# Patient Record
Sex: FEMALE | Race: WHITE | NOT HISPANIC OR LATINO | Employment: OTHER | URBAN - METROPOLITAN AREA
[De-identification: names, ages, dates, MRNs, and addresses within clinical notes are randomized per-mention and may not be internally consistent; named-entity substitution may affect disease eponyms.]

---

## 2022-06-04 ENCOUNTER — TELEPHONE ENCOUNTER (OUTPATIENT)
Dept: URBAN - METROPOLITAN AREA CLINIC 68 | Facility: CLINIC | Age: 87
End: 2022-06-04

## 2022-06-04 RX ORDER — SODIUM SULFATE, POTASSIUM SULFATE, MAGNESIUM SULFATE 17.5; 3.13; 1.6 G/ML; G/ML; G/ML
SOLUTION, CONCENTRATE ORAL AS DIRECTED
Qty: 1 | Refills: 0 | OUTPATIENT
Start: 2019-01-10 | End: 2019-01-11

## 2022-06-05 ENCOUNTER — TELEPHONE ENCOUNTER (OUTPATIENT)
Dept: URBAN - METROPOLITAN AREA CLINIC 68 | Facility: CLINIC | Age: 87
End: 2022-06-05

## 2022-06-05 RX ORDER — ESOMEPRAZOLE MAGNESIUM 20 MG
NEXIUM( 20MG ORAL 2 TABS DAILY ) ACTIVE -HX ENTRY CAPSULE,DELAYED RELEASE (ENTERIC COATED) ORAL DAILY
Status: ACTIVE | COMMUNITY
Start: 2019-01-10

## 2022-06-05 RX ORDER — NICOTINE POLACRILEX 2 MG
BIOTIN( 1MG ORAL  DAILY ) ACTIVE -HX ENTRY GUM BUCCAL DAILY
Status: ACTIVE | COMMUNITY
Start: 2019-01-10

## 2022-06-05 RX ORDER — LEVOTHYROXINE SODIUM 125 UG/1
SYNTHROID( 125MCG ORAL  DAILY ) ACTIVE -HX ENTRY TABLET ORAL DAILY
Status: ACTIVE | COMMUNITY
Start: 2019-01-10

## 2022-06-05 RX ORDER — VITAM B12 100 MCG
VITAMIN B12( 100MCG ORAL  DAILY ) ACTIVE -HX ENTRY TAB ORAL DAILY
Status: ACTIVE | COMMUNITY
Start: 2019-01-10

## 2022-06-05 RX ORDER — TRIAMTERENE/HYDROCHLOROTHIAZID 37.5-25 MG
DYAZIDE( 37.5-25MG ORAL  DAILY ) ACTIVE -HX ENTRY CAPSULE ORAL DAILY
Status: ACTIVE | COMMUNITY
Start: 2019-01-10

## 2022-06-25 ENCOUNTER — TELEPHONE ENCOUNTER (OUTPATIENT)
Age: 87
End: 2022-06-25

## 2022-06-25 RX ORDER — SODIUM SULFATE, POTASSIUM SULFATE, MAGNESIUM SULFATE 17.5; 3.13; 1.6 G/ML; G/ML; G/ML
SOLUTION, CONCENTRATE ORAL AS DIRECTED
Qty: 1 | Refills: 0 | OUTPATIENT
Start: 2019-01-10 | End: 2019-01-11

## 2022-06-26 ENCOUNTER — TELEPHONE ENCOUNTER (OUTPATIENT)
Age: 87
End: 2022-06-26

## 2022-06-26 RX ORDER — LEVOTHYROXINE SODIUM 125 MCG
SYNTHROID( 125MCG ORAL  DAILY ) ACTIVE -HX ENTRY TABLET ORAL DAILY
Status: ACTIVE | COMMUNITY
Start: 2019-01-10

## 2022-06-26 RX ORDER — NICOTINE POLACRILEX 2 MG
BIOTIN( 1MG ORAL  DAILY ) ACTIVE -HX ENTRY GUM BUCCAL DAILY
Status: ACTIVE | COMMUNITY
Start: 2019-01-10

## 2022-06-26 RX ORDER — ESOMEPRAZOLE MAGNESIUM 20 MG
NEXIUM( 20MG ORAL 2 TABS DAILY ) ACTIVE -HX ENTRY CAPSULE,DELAYED RELEASE (ENTERIC COATED) ORAL DAILY
Status: ACTIVE | COMMUNITY
Start: 2019-01-10

## 2022-06-26 RX ORDER — ELECTROLYTES/DEXTROSE
VITAMIN D3( 1000UNIT ORAL  DAILY ) ACTIVE -HX ENTRY SOLUTION, ORAL ORAL DAILY
Status: ACTIVE | COMMUNITY
Start: 2019-01-10

## 2022-06-26 RX ORDER — MULTIVITAMIN/IRON/FOLIC ACID 18MG-0.4MG
CENTRUM ADULTS(  ORAL  DAILY ) ACTIVE -HX ENTRY TABLET ORAL DAILY
Status: ACTIVE | COMMUNITY
Start: 2019-01-10

## 2022-06-26 RX ORDER — VITAM B12 100 MCG
VITAMIN B12( 100MCG ORAL  DAILY ) ACTIVE -HX ENTRY TAB ORAL DAILY
Status: ACTIVE | COMMUNITY
Start: 2019-01-10

## 2022-10-25 ENCOUNTER — OFFICE VISIT (OUTPATIENT)
Dept: URBAN - METROPOLITAN AREA CLINIC 68 | Facility: CLINIC | Age: 87
End: 2022-10-25

## 2022-10-25 RX ORDER — ESOMEPRAZOLE MAGNESIUM 20 MG
NEXIUM( 20MG ORAL 2 TABS DAILY ) ACTIVE -HX ENTRY CAPSULE,DELAYED RELEASE (ENTERIC COATED) ORAL DAILY
Status: ACTIVE | COMMUNITY
Start: 2019-01-10

## 2022-10-25 RX ORDER — LACTULOSE 10 G/15ML
15 ML SOLUTION ORAL EVERY 8 HOURS
Qty: 1350 ML | OUTPATIENT
Start: 2022-10-25 | End: 2022-11-23

## 2022-10-25 RX ORDER — VITAM B12 100 MCG
VITAMIN B12( 100MCG ORAL  DAILY ) ACTIVE -HX ENTRY TAB ORAL DAILY
Status: ACTIVE | COMMUNITY
Start: 2019-01-10

## 2022-10-25 RX ORDER — TRIAMTERENE/HYDROCHLOROTHIAZID 37.5-25 MG
DYAZIDE( 37.5-25MG ORAL  DAILY ) ACTIVE -HX ENTRY CAPSULE ORAL DAILY
Status: ACTIVE | COMMUNITY
Start: 2019-01-10

## 2022-10-25 RX ORDER — NICOTINE POLACRILEX 2 MG
BIOTIN( 1MG ORAL  DAILY ) ACTIVE -HX ENTRY GUM BUCCAL DAILY
Status: ACTIVE | COMMUNITY
Start: 2019-01-10

## 2022-10-25 RX ORDER — LEVOTHYROXINE SODIUM 125 UG/1
SYNTHROID( 125MCG ORAL  DAILY ) ACTIVE -HX ENTRY TABLET ORAL DAILY
Status: ACTIVE | COMMUNITY
Start: 2019-01-10

## 2022-10-25 NOTE — HPI-MIGRATED HPI
General : Patient comes in for evaluation of constipation. She refers that she has been constipated for most of her life. Nevertheless since the past several month she has been having worsening constipation. She was recently hospitalize for SBO that resolve on its own. She also was seen in the ER last week due to constipation. She underwent cat scan results revise detailed below. She is passing gasses.  Her last BM was today this morning small.

## 2022-11-01 ENCOUNTER — OFFICE VISIT (OUTPATIENT)
Dept: URBAN - METROPOLITAN AREA CLINIC 68 | Facility: CLINIC | Age: 87
End: 2022-11-01

## 2022-11-01 RX ORDER — NICOTINE POLACRILEX 2 MG
BIOTIN( 1MG ORAL  DAILY ) ACTIVE -HX ENTRY GUM BUCCAL DAILY
Status: ACTIVE | COMMUNITY
Start: 2019-01-10

## 2022-11-01 RX ORDER — LEVOTHYROXINE SODIUM 125 UG/1
SYNTHROID( 125MCG ORAL  DAILY ) ACTIVE -HX ENTRY TABLET ORAL DAILY
Status: ACTIVE | COMMUNITY
Start: 2019-01-10

## 2022-11-01 RX ORDER — ESOMEPRAZOLE MAGNESIUM 20 MG
NEXIUM( 20MG ORAL 2 TABS DAILY ) ACTIVE -HX ENTRY CAPSULE,DELAYED RELEASE (ENTERIC COATED) ORAL DAILY
Status: ACTIVE | COMMUNITY
Start: 2019-01-10

## 2022-11-01 RX ORDER — TRIAMTERENE/HYDROCHLOROTHIAZID 37.5-25 MG
DYAZIDE( 37.5-25MG ORAL  DAILY ) ACTIVE -HX ENTRY CAPSULE ORAL DAILY
Status: ACTIVE | COMMUNITY
Start: 2019-01-10

## 2022-11-01 RX ORDER — LACTULOSE 10 G/15ML
15 ML SOLUTION ORAL EVERY 8 HOURS
Qty: 1350 ML | Status: ACTIVE | COMMUNITY
Start: 2022-10-25 | End: 2022-11-23

## 2022-11-01 RX ORDER — VITAM B12 100 MCG
VITAMIN B12( 100MCG ORAL  DAILY ) ACTIVE -HX ENTRY TAB ORAL DAILY
Status: ACTIVE | COMMUNITY
Start: 2019-01-10

## 2022-11-01 NOTE — HPI-MIGRATED HPI
General : Patient comes in for evaluation of constipation. She refers that she has been constipated for most of her life. Nevertheless since the past several month she has been having worsening constipation. She was recently hospitalize for SBO that resolve on its own. She also was seen in the ER recently due to constipation. She underwent cat scan results revise detailed below. She is passing gasses.  On her last appointment she was started on lactulose TID. She comes in today for follow up. She refers she is taking lactulose QD to TID. She is having BM. She denies melena hematochezia hematemesis coffee ground emesis fever or chills.

## 2022-11-17 ENCOUNTER — ERX REFILL RESPONSE (OUTPATIENT)
Dept: URBAN - METROPOLITAN AREA CLINIC 68 | Facility: CLINIC | Age: 87
End: 2022-11-17

## 2022-11-17 RX ORDER — LACTULOSE 10 G/15ML
15 ML SOLUTION ORAL EVERY 8 HOURS
Qty: 1350 ML | OUTPATIENT

## 2022-11-17 RX ORDER — LACTULOSE 10 G/15ML
15 ML ORALLY EVERY 8 HOURS 30 DAYS SOLUTION ORAL
Qty: 1350 MILLILITER | Refills: 0 | OUTPATIENT

## 2022-12-13 ENCOUNTER — ERX REFILL RESPONSE (OUTPATIENT)
Dept: URBAN - METROPOLITAN AREA CLINIC 68 | Facility: CLINIC | Age: 87
End: 2022-12-13

## 2022-12-13 RX ORDER — LACTULOSE 10 G/15ML
15 ML ORALLY EVERY 8 HOURS 30 DAYS SOLUTION ORAL
Qty: 1350 MILLILITER | Refills: 0 | OUTPATIENT

## 2023-04-14 ENCOUNTER — OFFICE VISIT (OUTPATIENT)
Dept: URBAN - METROPOLITAN AREA CLINIC 68 | Facility: CLINIC | Age: 88
End: 2023-04-14

## 2023-04-14 ENCOUNTER — TELEPHONE ENCOUNTER (OUTPATIENT)
Dept: URBAN - METROPOLITAN AREA CLINIC 68 | Facility: CLINIC | Age: 88
End: 2023-04-14

## 2023-04-14 RX ORDER — ESOMEPRAZOLE MAGNESIUM 20 MG
NEXIUM( 20MG ORAL 2 TABS DAILY ) ACTIVE -HX ENTRY CAPSULE,DELAYED RELEASE (ENTERIC COATED) ORAL DAILY
Status: ACTIVE | COMMUNITY
Start: 2019-01-10

## 2023-04-14 RX ORDER — LACTULOSE 10 G/15ML
15 ML ORALLY EVERY 8 HOURS 30 DAYS SOLUTION ORAL
Qty: 1350 MILLILITER | Refills: 0 | Status: ACTIVE | COMMUNITY

## 2023-04-14 RX ORDER — VITAM B12 100 MCG
VITAMIN B12( 100MCG ORAL  DAILY ) ACTIVE -HX ENTRY TAB ORAL DAILY
Status: ACTIVE | COMMUNITY
Start: 2019-01-10

## 2023-04-14 RX ORDER — TRIAMTERENE/HYDROCHLOROTHIAZID 37.5-25 MG
DYAZIDE( 37.5-25MG ORAL  DAILY ) ACTIVE -HX ENTRY CAPSULE ORAL DAILY
Status: ACTIVE | COMMUNITY
Start: 2019-01-10

## 2023-04-14 RX ORDER — NICOTINE POLACRILEX 2 MG
BIOTIN( 1MG ORAL  DAILY ) ACTIVE -HX ENTRY GUM BUCCAL DAILY
Status: ACTIVE | COMMUNITY
Start: 2019-01-10

## 2023-04-14 RX ORDER — LEVOTHYROXINE SODIUM 125 UG/1
SYNTHROID( 125MCG ORAL  DAILY ) ACTIVE -HX ENTRY TABLET ORAL DAILY
Status: ACTIVE | COMMUNITY
Start: 2019-01-10

## 2023-04-14 NOTE — HPI-MIGRATED HPI
General : 89 yof that agreed today with audio only telemed follow up. She refers that she has been constipated for most of her life. Nevertheless since the past several month she has been having worsening constipation. She was recently hospitalize for SBO that resolve on its own. She also was seen in the ER recently due to constipation. She underwent cat scan results revise detailed below.   She refers today that she is having bowel movements daily but feels she does not empty out completely.  She is taking lactulose sporadically.  She refers that sometimes she has rectal discomfort.  She accounts this rectal discomfort due to hemorrhoids.

## 2023-05-15 ENCOUNTER — TELEPHONE ENCOUNTER (OUTPATIENT)
Dept: URBAN - METROPOLITAN AREA CLINIC 68 | Facility: CLINIC | Age: 88
End: 2023-05-15

## 2023-05-17 ENCOUNTER — TELEPHONE ENCOUNTER (OUTPATIENT)
Dept: URBAN - METROPOLITAN AREA CLINIC 68 | Facility: CLINIC | Age: 88
End: 2023-05-17

## 2023-06-13 ENCOUNTER — TELEPHONE ENCOUNTER (OUTPATIENT)
Dept: URBAN - METROPOLITAN AREA CLINIC 68 | Facility: CLINIC | Age: 88
End: 2023-06-13

## 2023-06-27 ENCOUNTER — ERX REFILL RESPONSE (OUTPATIENT)
Dept: URBAN - METROPOLITAN AREA CLINIC 68 | Facility: CLINIC | Age: 88
End: 2023-06-27

## 2023-06-27 RX ORDER — LACTULOSE 10 G/15ML
TAKE 15 ML ORALLY EVERY 8 HOURS FOR 30 DAYS SOLUTION ORAL
Qty: 1350 MILLILITER | Refills: 0 | OUTPATIENT

## 2023-09-29 ENCOUNTER — WEB ENCOUNTER (OUTPATIENT)
Dept: URBAN - METROPOLITAN AREA CLINIC 68 | Facility: CLINIC | Age: 88
End: 2023-09-29

## 2023-09-29 ENCOUNTER — OFFICE VISIT (OUTPATIENT)
Dept: URBAN - METROPOLITAN AREA CLINIC 68 | Facility: CLINIC | Age: 88
End: 2023-09-29
Payer: MEDICARE

## 2023-09-29 ENCOUNTER — DASHBOARD ENCOUNTERS (OUTPATIENT)
Age: 88
End: 2023-09-29

## 2023-09-29 VITALS
DIASTOLIC BLOOD PRESSURE: 78 MMHG | HEIGHT: 61 IN | BODY MASS INDEX: 26.43 KG/M2 | SYSTOLIC BLOOD PRESSURE: 136 MMHG | WEIGHT: 140 LBS

## 2023-09-29 DIAGNOSIS — K64.8 INTERNAL HEMORRHOID: ICD-10-CM

## 2023-09-29 DIAGNOSIS — K62.89 ANAL PAIN: ICD-10-CM

## 2023-09-29 DIAGNOSIS — K59.09 CONSTIPATION: ICD-10-CM

## 2023-09-29 PROBLEM — 90458007: Status: ACTIVE | Noted: 2023-09-29

## 2023-09-29 PROCEDURE — 99214 OFFICE O/P EST MOD 30 MIN: CPT | Performed by: INTERNAL MEDICINE

## 2023-09-29 RX ORDER — NICOTINE POLACRILEX 2 MG
BIOTIN( 1MG ORAL  DAILY ) ACTIVE -HX ENTRY GUM BUCCAL DAILY
Status: ACTIVE | COMMUNITY
Start: 2019-01-10

## 2023-09-29 RX ORDER — LACTULOSE 10 G/15ML
TAKE 15 ML ORALLY EVERY 8 HOURS FOR 30 DAYS SOLUTION ORAL
Qty: 1350 MILLILITER | Refills: 0 | Status: ACTIVE | COMMUNITY

## 2023-09-29 RX ORDER — ESOMEPRAZOLE MAGNESIUM 20 MG
NEXIUM( 20MG ORAL 2 TABS DAILY ) ACTIVE -HX ENTRY CAPSULE,DELAYED RELEASE (ENTERIC COATED) ORAL DAILY
Status: ACTIVE | COMMUNITY
Start: 2019-01-10

## 2023-09-29 RX ORDER — LEVOTHYROXINE SODIUM 125 UG/1
SYNTHROID( 125MCG ORAL  DAILY ) ACTIVE -HX ENTRY TABLET ORAL DAILY
Status: ACTIVE | COMMUNITY
Start: 2019-01-10

## 2023-09-29 RX ORDER — HYDROCORTISONE ACETATE AND PRAMOXINE HYDROCHLORIDE 25; 10 MG/G; MG/G
1 APPLICATION CREAM TOPICAL THREE TIMES A DAY
Qty: 1 UNSPECIFIED | OUTPATIENT
Start: 2023-09-29 | End: 2023-12-27

## 2023-09-29 RX ORDER — TRIAMTERENE/HYDROCHLOROTHIAZID 37.5-25 MG
DYAZIDE( 37.5-25MG ORAL  DAILY ) ACTIVE -HX ENTRY CAPSULE ORAL DAILY
Status: ACTIVE | COMMUNITY
Start: 2019-01-10

## 2023-09-29 RX ORDER — VITAM B12 100 MCG
VITAMIN B12( 100MCG ORAL  DAILY ) ACTIVE -HX ENTRY TAB ORAL DAILY
Status: ACTIVE | COMMUNITY
Start: 2019-01-10

## 2023-09-29 NOTE — HPI-MIGRATED HPI
89 yof that agreed today with audio only telemed follow up. She refers that she has been constipated for most of her life. Nevertheless since the past several month she has been having worsening constipation. She was recently hospitalize for SBO that resolve on its own. She also was seen in the ER recently due to constipation. She underwent cat scan results revise detailed below. On the last appointment she was started on Lactulose. She comes in today for follow up. She refers she continues with bouts of pain. She feels that lactulose was too strong her. She continues with intermittent bouts of rectal discomfort. She is unsure if the hemorrhoidal ointment help her rectal discomfort.

## 2023-09-29 NOTE — PHYSICAL EXAM GASTROINTESTINAL
Abdomen , soft, nontender, nondistended , no guarding or rigidity , no masses palpable , normal bowel sounds , Liver and Spleen , no hepatomegaly present , no hepatosplenomegaly , liver nontender , spleen not palpable BONNIE no mass felt fullness in the internal hemorrhoidal cushion some tenderness no blood

## 2023-10-05 ENCOUNTER — TELEPHONE ENCOUNTER (OUTPATIENT)
Dept: URBAN - METROPOLITAN AREA CLINIC 68 | Facility: CLINIC | Age: 88
End: 2023-10-05

## 2024-01-16 ENCOUNTER — TELEPHONE ENCOUNTER (OUTPATIENT)
Dept: URBAN - METROPOLITAN AREA CLINIC 68 | Facility: CLINIC | Age: 89
End: 2024-01-16

## 2024-01-16 RX ORDER — HYDROCORTISONE ACETATE AND PRAMOXINE HYDROCHLORIDE 25; 10 MG/G; MG/G
1 APPLICATION CREAM TOPICAL THREE TIMES A DAY
Qty: 1 UNSPECIFIED
Start: 2023-09-29 | End: 2024-04-15

## 2024-01-17 ENCOUNTER — TELEPHONE ENCOUNTER (OUTPATIENT)
Dept: URBAN - METROPOLITAN AREA CLINIC 68 | Facility: CLINIC | Age: 89
End: 2024-01-17

## 2024-01-17 RX ORDER — HYDROCORTISONE ACETATE AND PRAMOXINE HYDROCHLORIDE 25; 10 MG/G; MG/G
1 APPLICATION CREAM TOPICAL THREE TIMES A DAY
Qty: 1 UNSPECIFIED | Refills: 0
Start: 2023-09-29 | End: 2024-04-16